# Patient Record
Sex: FEMALE | Race: OTHER | HISPANIC OR LATINO | URBAN - METROPOLITAN AREA
[De-identification: names, ages, dates, MRNs, and addresses within clinical notes are randomized per-mention and may not be internally consistent; named-entity substitution may affect disease eponyms.]

---

## 2021-05-01 ENCOUNTER — OUTPATIENT (OUTPATIENT)
Dept: OUTPATIENT SERVICES | Facility: HOSPITAL | Age: 76
LOS: 1 days | End: 2021-05-01
Payer: MEDICARE

## 2021-05-11 ENCOUNTER — EMERGENCY (EMERGENCY)
Facility: HOSPITAL | Age: 76
LOS: 1 days | Discharge: HOME | End: 2021-05-11
Attending: EMERGENCY MEDICINE
Payer: MEDICARE

## 2021-05-11 VITALS
TEMPERATURE: 98 F | HEIGHT: 62 IN | SYSTOLIC BLOOD PRESSURE: 152 MMHG | HEART RATE: 63 BPM | DIASTOLIC BLOOD PRESSURE: 81 MMHG | WEIGHT: 171.08 LBS | RESPIRATION RATE: 16 BRPM | OXYGEN SATURATION: 97 %

## 2021-05-11 VITALS
DIASTOLIC BLOOD PRESSURE: 93 MMHG | TEMPERATURE: 98 F | SYSTOLIC BLOOD PRESSURE: 140 MMHG | OXYGEN SATURATION: 98 % | RESPIRATION RATE: 16 BRPM | HEART RATE: 68 BPM

## 2021-05-11 LAB
ALBUMIN SERPL ELPH-MCNC: 4.6 G/DL — SIGNIFICANT CHANGE UP (ref 3.3–5)
ALP SERPL-CCNC: 52 U/L — SIGNIFICANT CHANGE UP (ref 40–120)
ALT FLD-CCNC: 20 U/L — SIGNIFICANT CHANGE UP (ref 10–45)
ANION GAP SERPL CALC-SCNC: 15 MMOL/L — SIGNIFICANT CHANGE UP (ref 5–17)
AST SERPL-CCNC: 25 U/L — SIGNIFICANT CHANGE UP (ref 10–40)
BASOPHILS # BLD AUTO: 0.07 K/UL — SIGNIFICANT CHANGE UP (ref 0–0.2)
BASOPHILS NFR BLD AUTO: 1.1 % — SIGNIFICANT CHANGE UP (ref 0–2)
BILIRUB SERPL-MCNC: 0.5 MG/DL — SIGNIFICANT CHANGE UP (ref 0.2–1.2)
BUN SERPL-MCNC: 15 MG/DL — SIGNIFICANT CHANGE UP (ref 7–23)
CALCIUM SERPL-MCNC: 10.2 MG/DL — SIGNIFICANT CHANGE UP (ref 8.4–10.5)
CHLORIDE SERPL-SCNC: 99 MMOL/L — SIGNIFICANT CHANGE UP (ref 96–108)
CO2 SERPL-SCNC: 22 MMOL/L — SIGNIFICANT CHANGE UP (ref 22–31)
CREAT SERPL-MCNC: 0.79 MG/DL — SIGNIFICANT CHANGE UP (ref 0.5–1.3)
CRP SERPL-MCNC: <3 MG/L — SIGNIFICANT CHANGE UP (ref 0–4)
EOSINOPHIL # BLD AUTO: 0.06 K/UL — SIGNIFICANT CHANGE UP (ref 0–0.5)
EOSINOPHIL NFR BLD AUTO: 0.9 % — SIGNIFICANT CHANGE UP (ref 0–6)
GLUCOSE SERPL-MCNC: 115 MG/DL — HIGH (ref 70–99)
HCT VFR BLD CALC: 39.5 % — SIGNIFICANT CHANGE UP (ref 34.5–45)
HGB BLD-MCNC: 13 G/DL — SIGNIFICANT CHANGE UP (ref 11.5–15.5)
IMM GRANULOCYTES NFR BLD AUTO: 0.5 % — SIGNIFICANT CHANGE UP (ref 0–1.5)
LYMPHOCYTES # BLD AUTO: 1.8 K/UL — SIGNIFICANT CHANGE UP (ref 1–3.3)
LYMPHOCYTES # BLD AUTO: 27.7 % — SIGNIFICANT CHANGE UP (ref 13–44)
MCHC RBC-ENTMCNC: 28.3 PG — SIGNIFICANT CHANGE UP (ref 27–34)
MCHC RBC-ENTMCNC: 32.9 GM/DL — SIGNIFICANT CHANGE UP (ref 32–36)
MCV RBC AUTO: 85.9 FL — SIGNIFICANT CHANGE UP (ref 80–100)
MONOCYTES # BLD AUTO: 0.62 K/UL — SIGNIFICANT CHANGE UP (ref 0–0.9)
MONOCYTES NFR BLD AUTO: 9.6 % — SIGNIFICANT CHANGE UP (ref 2–14)
NEUTROPHILS # BLD AUTO: 3.91 K/UL — SIGNIFICANT CHANGE UP (ref 1.8–7.4)
NEUTROPHILS NFR BLD AUTO: 60.2 % — SIGNIFICANT CHANGE UP (ref 43–77)
NRBC # BLD: 0 /100 WBCS — SIGNIFICANT CHANGE UP (ref 0–0)
PLATELET # BLD AUTO: 241 K/UL — SIGNIFICANT CHANGE UP (ref 150–400)
POTASSIUM SERPL-MCNC: 4.3 MMOL/L — SIGNIFICANT CHANGE UP (ref 3.5–5.3)
POTASSIUM SERPL-SCNC: 4.3 MMOL/L — SIGNIFICANT CHANGE UP (ref 3.5–5.3)
PROT SERPL-MCNC: 8 G/DL — SIGNIFICANT CHANGE UP (ref 6–8.3)
RBC # BLD: 4.6 M/UL — SIGNIFICANT CHANGE UP (ref 3.8–5.2)
RBC # FLD: 14.3 % — SIGNIFICANT CHANGE UP (ref 10.3–14.5)
SODIUM SERPL-SCNC: 136 MMOL/L — SIGNIFICANT CHANGE UP (ref 135–145)
WBC # BLD: 6.49 K/UL — SIGNIFICANT CHANGE UP (ref 3.8–10.5)
WBC # FLD AUTO: 6.49 K/UL — SIGNIFICANT CHANGE UP (ref 3.8–10.5)

## 2021-05-11 PROCEDURE — 70496 CT ANGIOGRAPHY HEAD: CPT | Mod: 26,MG

## 2021-05-11 PROCEDURE — 99285 EMERGENCY DEPT VISIT HI MDM: CPT

## 2021-05-11 PROCEDURE — 99284 EMERGENCY DEPT VISIT MOD MDM: CPT | Mod: 25

## 2021-05-11 PROCEDURE — 80053 COMPREHEN METABOLIC PANEL: CPT

## 2021-05-11 PROCEDURE — 70496 CT ANGIOGRAPHY HEAD: CPT

## 2021-05-11 PROCEDURE — 85025 COMPLETE CBC W/AUTO DIFF WBC: CPT

## 2021-05-11 PROCEDURE — 70450 CT HEAD/BRAIN W/O DYE: CPT

## 2021-05-11 PROCEDURE — 96374 THER/PROPH/DIAG INJ IV PUSH: CPT | Mod: XU

## 2021-05-11 PROCEDURE — 86140 C-REACTIVE PROTEIN: CPT

## 2021-05-11 PROCEDURE — 70498 CT ANGIOGRAPHY NECK: CPT | Mod: 26,MG

## 2021-05-11 PROCEDURE — 85652 RBC SED RATE AUTOMATED: CPT

## 2021-05-11 PROCEDURE — 70498 CT ANGIOGRAPHY NECK: CPT

## 2021-05-11 PROCEDURE — G1004: CPT

## 2021-05-11 RX ORDER — METOCLOPRAMIDE HCL 10 MG
10 TABLET ORAL ONCE
Refills: 0 | Status: COMPLETED | OUTPATIENT
Start: 2021-05-11 | End: 2021-05-11

## 2021-05-11 RX ADMIN — Medication 10 MILLIGRAM(S): at 15:29

## 2021-05-11 NOTE — ED PROVIDER NOTE - PATIENT PORTAL LINK FT
You can access the FollowMyHealth Patient Portal offered by [facility] by registering at the following website: [email or web address]. By joining [facility]’s FollowMyHealth portal, you will also be able to view your health information using other applications (apps) compatible with our system.

## 2021-05-11 NOTE — ED PROVIDER NOTE - NS ED ROS FT
ROS:  GENERAL: No fever, no chills  EYES: no change in vision  HEENT: no trouble swallowing, no trouble speaking  CARDIAC: no chest pain  PULMONARY: no cough, no shortness of breath  GI: no abdominal pain, no nausea, no vomiting, no diarrhea, no constipation  : No dysuria, no frequency, no change in appearance, or odor of urine  SKIN: no rashes  NEURO: + headache, no weakness  MSK: No joint pain  ~Jonas Johnson D.O. -Resident

## 2021-05-11 NOTE — ED PROVIDER NOTE - OBJECTIVE STATEMENT
[id number] Simeon 76yo f pmhx HTN Hypothyroid pw right sided ha radiating to right side of neck for 1 week. reports today having worsening pain. gradual onset and relieved by tylenol. pain worse with leaning forward no vision changes. Denies recent trauma, fevers, chills, dizziness, nausea, vomiting, dysuria, freq, hematuria, diarrhea, constipation, chest pain, shortness of breath, cough.

## 2021-05-11 NOTE — ED PROVIDER NOTE - CLINICAL SUMMARY MEDICAL DECISION MAKING FREE TEXT BOX
pettet- 76yo f pmhx HTN Hypothyroid pw right sided ha radiating to right side of neck for 1 week. Patient has no past history of headaches. There is not a history of  anticoagulation, trauma, pregnancy, cancer or immunocompromised state.  Mental status was normal, no neurological deficits were noted. will obtain labs, analgesia, cta dispo pending reeval

## 2021-05-11 NOTE — ED PROVIDER NOTE - NSFOLLOWUPINSTRUCTIONS_ED_ALL_ED_FT
Dolor de marjorie    Un dolor de marjorie es un dolor o malestar que se siente alrededor del área de la marjorie o el javed. Es posible que no se encuentre la causa específica de un dolor de marjorie, ya que existen muchos tipos, incluidos los yousuf de marjorie por tensión, las migrañas y los yousuf de marjorie en racimo. Observe christiansen condición para detectar cualquier cambio. Las cosas que puede hacer para controlar christiansen dolor incluyen marisol medicamentos de venta yobani y recetados según las instrucciones de christiansen proveedor de atención médica, acostarse en gela habitación oscura y silenciosa, limitar el estrés, dormir con regularidad y abstenerse de consumir alcohol y productos de tabaco.    BUSQUE ATENCIÓN MÉDICA INMEDIATA SI TIENE ALGUNO DE LOS SIGUIENTES SÍNTOMAS: fiebre, vómitos, rigidez en el javed, pérdida de visión, problemas con el habla, debilidad muscular, pérdida del equilibrio, dificultad para caminar, desmayos o confusión.    1. TOME TODOS LOS MEDICAMENTOS SEGÚN LAS INDICACIONES.  2. PARA EL DOLOR O LA FIEBRE, PUEDE MARISOL IBUPROFEN (MOTRIN, ADVIL) O ACETAMINOPHEN (TYLENOL) SEGÚN SE INDIQUE EN EL EMBALAJE.  3. DÉ SEGUIMIENTO A CHRISTIANSEN MÉDICO DE PRIMARIA DENTRO DE LOS 5 DÍAS SEGÚN LAS INDICACIONES.  4. SI USTED TIENE LABORATORIOS O IMÁGENES REALIZADAS, SE LE KELLY COPIAS DE TODOS LOS LABORATORIOS Y / O RESULTADOS DE IMÁGENES DE CHRISTIANSEN VISITA A LA ER - POR FAVOR LLEVARLOS CON USTED A OSVALDO MARIELLE DE SEGUIMIENTO.  5. REGRESE A LA URBANIZACIÓN POR CUALQUIER SÍNTOMA O PREOCUPACIÓN QUE AGREGUE.

## 2021-05-11 NOTE — ED PROVIDER NOTE - PHYSICAL EXAMINATION
Physical Exam:  Gen: NAD, AOx3, non-toxic appearing  Head: normal appearing  Head: no scalp abnormalities, no signs of basilar skull fracture  Eyes:  Visual fields are Full; eyes are aligned, lids, conjunctivae and sclera are normal; pupils are 3mm and equal; brisk response to light; extraocular movements intact  Ears: Outer ear without lesions, normal acuity, tympanic canals normal, tympanic membranes with normal light reflex, no erythema or bulge  Nose/Sinuses: Nasal mucosa non-inflamed, Nasal Septum midline, no tenderness over maxillary or frontal sinuses, no crepitus over any facial sinuses  Mouth: Normal lips, tongue, gums and teeth, pharynx is non erythematous, tonsils normal sized and without exudates, uvula is midline, moist mucous membranes   Neck: Nontender bilateral tonsilar and anterior cervical nodes, no occipital, auricular, submandibular, submental or supraclavicular nodes, trachea in midline, thyroid lobes not palpable, no crepitus, neck FROM   Lung:  no respiratory distress, speaking in full sentences, clear to ascultation bilaterally     CV: regular rate and rhythm   Abd: soft, ND, NT  MSK: no visible deformities  Neuro: No focal deficits  Skin: Warm  Psych: normal affect  ~Jonas Johnson D.O. -Resident

## 2021-05-11 NOTE — ED PROVIDER NOTE - PROGRESS NOTE DETAILS
Patient feels well, tolerating PO, headache relieved. Discussed lab and radiology findings with patient. Patient feels comfortable going home. Gave home care and follow up instructions. Discussed which symptoms to look out for and when to return to the ED for further evaluation. Patient given opportunity to ask questions about their medical condition and had all questions answered.

## 2021-05-11 NOTE — ED ADULT NURSE NOTE - OBJECTIVE STATEMENT
75 year old female with PMH of HTN, HLD, CAD, presents with right sided head pain radiating to the right ear and neck that began one week ago gradually and has since grown in intensity. She states that she had this pain previously and was diagnosed with sinusitis. Pain is worse upon dropping head down. She endorses nausea but denies visual changes, blurred vision, weakness, dizziness, weakness, chest pain, shortness of breath, palpitations.

## 2021-05-12 LAB — ERYTHROCYTE [SEDIMENTATION RATE] IN BLOOD: 42 MM/HR — HIGH (ref 0–20)

## 2021-05-12 NOTE — ED POST DISCHARGE NOTE - OTHER COMMUNICATION
5/17: spoke patient with  [id number], discussed results and recommended follow up with PCP - Ana Little PA-C

## 2021-05-12 NOTE — ED POST DISCHARGE NOTE - DETAILS
LVM for pt to return call to ED 5/14/21: LVM for pt with [facility] interp #[phone] gave admin # for callback. - JEROME AdenC 5/15/21: left VM with  #[id number] for c/b. -Caterina Govea PA-C

## 2021-05-20 DIAGNOSIS — Z71.89 OTHER SPECIFIED COUNSELING: ICD-10-CM

## 2021-08-01 PROCEDURE — G9005: CPT

## 2021-08-25 NOTE — ED ADULT NURSE NOTE - BREATHING, MLM
8/26/2021  ?  Rudy Solomon  ?  ?  Dear Team Member,    Your [employer] Covid Team has attempted to contact you. We have been unsuccessful in our attempts. We are trying to speak with you regarding the Safe Check alert and the Team Member COVID-19 Exposure Evaluation Tool submission. We need to speak with you. Please call the Hotline number listed below.     [facility] Covid Team Hotline: [phone] Option 8    Hours: M-F 7:00am-4:00pm Saturday and Sunday 8:00am-12:00pm    I respectfully ask that you please do not reply to this email with personal questions regarding your encounter with Employee Health. If you need further assistance, or have any questions regarding this encounter, please contact the COVID team hotline at [phone].    Thank you for understanding,    [facility] Covid Team    Cc: Manager     Spontaneous, unlabored and symmetrical
